# Patient Record
Sex: MALE | Race: BLACK OR AFRICAN AMERICAN | NOT HISPANIC OR LATINO | URBAN - METROPOLITAN AREA
[De-identification: names, ages, dates, MRNs, and addresses within clinical notes are randomized per-mention and may not be internally consistent; named-entity substitution may affect disease eponyms.]

---

## 2018-10-23 PROBLEM — 29857009 CHEST PAIN: Status: ACTIVE | Noted: 2018-10-23

## 2018-10-23 PROBLEM — 102614006 GENERALIZED ABDOMINAL PAIN: Status: ACTIVE | Noted: 2018-10-23

## 2018-10-23 PROBLEM — 4556007 GASTRITIS: Status: ACTIVE | Noted: 2018-10-23

## 2018-10-23 PROBLEM — 3696007 FUNCTIONAL DYSPEPSIA: Status: ACTIVE | Noted: 2018-10-23

## 2019-11-12 PROBLEM — 428283002 HISTORY OF POLYP OF COLON (SITUATION): Status: ACTIVE | Noted: 2019-11-12

## 2020-09-24 ENCOUNTER — OFFICE VISIT (OUTPATIENT)
Dept: URBAN - METROPOLITAN AREA CLINIC 29 | Facility: CLINIC | Age: 65
End: 2020-09-24

## 2020-09-24 PROBLEM — 414916001 OBESITY: Status: ACTIVE | Noted: 2020-09-24

## 2022-02-09 ENCOUNTER — OFFICE VISIT (OUTPATIENT)
Dept: URBAN - METROPOLITAN AREA CLINIC 27 | Facility: CLINIC | Age: 67
End: 2022-02-09

## 2022-03-18 ENCOUNTER — OFFICE VISIT (OUTPATIENT)
Dept: URBAN - METROPOLITAN AREA CLINIC 27 | Facility: CLINIC | Age: 67
End: 2022-03-18

## 2022-04-30 ENCOUNTER — TELEPHONE ENCOUNTER (OUTPATIENT)
Dept: URBAN - METROPOLITAN AREA CLINIC 121 | Facility: CLINIC | Age: 67
End: 2022-04-30

## 2022-04-30 RX ORDER — FAMOTIDINE 20 MG
QD TABLET ORAL
OUTPATIENT
Start: 2014-08-08 | End: 2015-03-02

## 2022-04-30 RX ORDER — SUCRALFATE 1 G/1
1 TABLET PO 1/2HR QAC AND QHS TABLET ORAL
OUTPATIENT
Start: 2014-08-08

## 2022-04-30 RX ORDER — SUCRALFATE 1 G/1
1 TABLET PO 1/2HR QAC AND QHS (QID TOTAL) TABLET ORAL
OUTPATIENT
Start: 2015-12-07 | End: 2018-10-23

## 2022-04-30 RX ORDER — TADALAFIL 5 MG/1
TABLET, FILM COATED ORAL
OUTPATIENT
Start: 2017-07-18

## 2022-04-30 RX ORDER — ESOMEPRAZOLE MAGNESIUM 40 MG
1 CAPSULE PO QD CAPSULE,DELAYED RELEASE (ENTERIC COATED) ORAL
OUTPATIENT
Start: 2015-12-07 | End: 2018-10-23

## 2022-04-30 RX ORDER — CHLORDIAZEPOXIDE HYDROCHLORIDE AND CLIDINIUM BROMIDE 5; 2.5 MG/1; MG/1
TAKE 1 CAPSULE 30 MINUTES BEFORE MEALS AND AT BEDTIME FOR ESOPHAGEAL SPASM CAPSULE, GELATIN COATED ORAL
OUTPATIENT
Start: 2019-07-09

## 2022-04-30 RX ORDER — CLONAZEPAM 0.5 MG/1
PRN TABLET ORAL
OUTPATIENT
Start: 2015-03-02 | End: 2015-12-07

## 2022-04-30 RX ORDER — SUCRALFATE 1 G/1
1 TABLET PO 1/2HR QAC AND QHS (QID TOTAL) TABLET ORAL
OUTPATIENT
Start: 2015-12-07

## 2022-04-30 RX ORDER — ATENOLOL 50 MG/1
QD TABLET ORAL
OUTPATIENT
Start: 2015-03-02

## 2022-04-30 RX ORDER — ATENOLOL 25 MG/1
QD TABLET ORAL
OUTPATIENT
Start: 2014-08-11

## 2022-04-30 RX ORDER — SUCRALFATE 1 G/10ML
1GM PO QAC AND QHS SUSPENSION ORAL
OUTPATIENT
Start: 2017-08-17 | End: 2018-10-23

## 2022-04-30 RX ORDER — HYOSCYAMINE SULFATE 0.12 MG/1
DISSOLVE 1/2 TO 1 TABLET UNDER THE TONGUE QAC AND QHS PRN TABLET, ORALLY DISINTEGRATING ORAL
OUTPATIENT
Start: 2016-12-12 | End: 2018-10-23

## 2022-04-30 RX ORDER — CHLORDIAZEPOXIDE HYDROCHLORIDE AND CLIDINIUM BROMIDE 5; 2.5 MG/1; MG/1
1 CAPSULE PO 30 MINUTES BEFORE MEALS AND QHS FOR ESOPHAGEAL SPASM CAPSULE ORAL
OUTPATIENT
Start: 2017-01-30

## 2022-04-30 RX ORDER — ESOMEPRAZOLE MAGNESIUM 40 MG
1 CAPSULE PO QD CAPSULE,DELAYED RELEASE (ENTERIC COATED) ORAL
OUTPATIENT
Start: 2017-01-30

## 2022-04-30 RX ORDER — CLONAZEPAM 0.5 MG/1
PRN TABLET ORAL
OUTPATIENT
Start: 2015-03-02

## 2022-04-30 RX ORDER — ESOMEPRAZOLE MAGNESIUM 40 MG
1 CAPSULE PO QD CAPSULE,DELAYED RELEASE (ENTERIC COATED) ORAL
OUTPATIENT
Start: 2018-10-23

## 2022-04-30 RX ORDER — SUCRALFATE 1 G/10ML
1GM PO QAC AND QHS SUSPENSION ORAL
OUTPATIENT
Start: 2017-08-17

## 2022-04-30 RX ORDER — HYOSCYAMINE SULFATE 0.12 MG/1
DISSOLVE 1/2 TO 1 TABLET UNDER THE TONGUE QAC AND QHS PRN TABLET, ORALLY DISINTEGRATING ORAL
OUTPATIENT
Start: 2016-12-12

## 2022-04-30 RX ORDER — ATENOLOL 25 MG/1
TABLET ORAL
OUTPATIENT
Start: 2010-08-25

## 2022-04-30 RX ORDER — CHLORDIAZEPOXIDE HYDROCHLORIDE AND CLIDINIUM BROMIDE 5; 2.5 MG/1; MG/1
TAKE 1 CAPSULE 30 MINUTES BEFORE MEALS AND AT BEDTIME FOR ESOPHAGEAL SPASM CAPSULE, GELATIN COATED ORAL
OUTPATIENT
Start: 2019-07-09 | End: 2021-10-04

## 2022-04-30 RX ORDER — ASPIRIN 81 MG/1
QD TABLET ORAL
OUTPATIENT
Start: 2015-03-02

## 2022-04-30 RX ORDER — HYOSCYAMINE SULFATE 0.12 MG/1
DISSOLVE 1/2 TO 1 TABLET UNDER TONGUE QAC AND QHS PRN TABLET, ORALLY DISINTEGRATING ORAL
OUTPATIENT
Start: 2016-11-17 | End: 2016-12-12

## 2022-04-30 RX ORDER — CHLORDIAZEPOXIDE HYDROCHLORIDE AND CLIDINIUM BROMIDE 5; 2.5 MG/1; MG/1
TAKE 1 CAPSULE 30 MINUTES BEFORE MEALS AND AT BEDTIME FOR ESOPHAGEAL SPASM CAPSULE ORAL
OUTPATIENT
Start: 2018-11-20

## 2022-04-30 RX ORDER — CHLORDIAZEPOXIDE HYDROCHLORIDE AND CLIDINIUM BROMIDE 5; 2.5 MG/1; MG/1
1 CAPSULE PO 30 MINUTES BEFORE MEALS AND QHS FOR ESOPHAGEAL SPASM CAPSULE ORAL
OUTPATIENT
Start: 2017-01-30 | End: 2018-10-23

## 2022-04-30 RX ORDER — ASPIRIN 81 MG/1
QD TABLET ORAL
OUTPATIENT
Start: 2015-03-02 | End: 2017-08-03

## 2022-04-30 RX ORDER — FINASTERIDE 5 MG/1
QD TABLET, FILM COATED ORAL
OUTPATIENT
Start: 2014-08-08 | End: 2015-03-02

## 2022-04-30 RX ORDER — HYDROCHLOROTHIAZIDE 12.5 MG/1
CAPSULE ORAL
OUTPATIENT
Start: 2011-08-29

## 2022-04-30 RX ORDER — CHLORDIAZEPOXIDE HYDROCHLORIDE AND CLIDINIUM BROMIDE 5; 2.5 MG/1; MG/1
TAKE 1 CAPSULE 30 MINUTES BEFORE MEALS AND AT BEDTIME FOR ESOPHAGEAL SPASM CAPSULE ORAL
OUTPATIENT
Start: 2018-11-20 | End: 2021-10-04

## 2022-04-30 RX ORDER — HYDROCHLOROTHIAZIDE 12.5 MG/1
QD CAPSULE ORAL
OUTPATIENT
Start: 2014-08-11

## 2022-04-30 RX ORDER — TADALAFIL 5 MG/1
TABLET, FILM COATED ORAL
OUTPATIENT
Start: 2017-07-18 | End: 2018-10-23

## 2022-04-30 RX ORDER — FAMOTIDINE 20 MG
QD TABLET ORAL
OUTPATIENT
Start: 2014-08-08

## 2022-04-30 RX ORDER — ESOMEPRAZOLE MAGNESIUM 40 MG
1 CAPSULE PO QD CAPSULE,DELAYED RELEASE (ENTERIC COATED) ORAL
OUTPATIENT
Start: 2015-12-07

## 2022-04-30 RX ORDER — ESOMEPRAZOLE MAGNESIUM 40 MG
1 CAPSULE PO QD CAPSULE,DELAYED RELEASE (ENTERIC COATED) ORAL
OUTPATIENT
Start: 2018-10-23 | End: 2022-02-09

## 2022-04-30 RX ORDER — HYOSCYAMINE SULFATE 0.12 MG/1
DISSOLVE 1/2 TO 1 TABLET UNDER TONGUE QAC AND QHS PRN TABLET, ORALLY DISINTEGRATING ORAL
OUTPATIENT
Start: 2016-11-17

## 2022-04-30 RX ORDER — ESOMEPRAZOLE MAGNESIUM 40 MG
1 CAPSULE PO QAM CAPSULE,DELAYED RELEASE (ENTERIC COATED) ORAL
OUTPATIENT
Start: 2017-01-30 | End: 2018-10-23

## 2022-04-30 RX ORDER — OMEPRAZOLE 20 MG/1
QD CAPSULE, DELAYED RELEASE ORAL
OUTPATIENT
Start: 2014-08-08

## 2022-04-30 RX ORDER — FINASTERIDE 5 MG/1
QD TABLET, FILM COATED ORAL
OUTPATIENT
Start: 2014-08-08

## 2022-04-30 RX ORDER — HYOSCYAMINE SULFATE 0.12 MG/1
DISSOLVE 1/2 TO 1 TABLET UNDER TONGUE QAC AND QHS PRN TABLET, ORALLY DISINTEGRATING ORAL
OUTPATIENT
Start: 2016-11-17 | End: 2018-10-23

## 2022-04-30 RX ORDER — OMEPRAZOLE 20 MG/1
1 CAPSULE PO QAM CAPSULE, DELAYED RELEASE ORAL
OUTPATIENT
Start: 2014-08-08 | End: 2015-12-07

## 2022-05-01 ENCOUNTER — TELEPHONE ENCOUNTER (OUTPATIENT)
Dept: URBAN - METROPOLITAN AREA CLINIC 121 | Facility: CLINIC | Age: 67
End: 2022-05-01

## 2022-05-01 RX ORDER — PIROXICAM 20 MG/1
CAPSULE ORAL
Status: ACTIVE | COMMUNITY
Start: 2020-09-24

## 2022-05-01 RX ORDER — CHLORDIAZEPOXIDE HYDROCHLORIDE 5 MG/1
CAPSULE ORAL
Status: ACTIVE | COMMUNITY
Start: 2018-10-23

## 2022-05-01 RX ORDER — ESOMEPRAZOLE MAGNESIUM 40 MG
TAKE 1 CAPSULE BY MOUTH TWICE A DAY CAPSULE,DELAYED RELEASE (ENTERIC COATED) ORAL
Status: ACTIVE | COMMUNITY
Start: 2022-02-09

## 2022-05-01 RX ORDER — OMEPRAZOLE 20 MG/1
CAPSULE, DELAYED RELEASE ORAL
Status: ACTIVE | COMMUNITY
Start: 2018-10-23

## 2022-05-01 RX ORDER — FAMOTIDINE 10 MG
1 CAPSULE PO EACH EVENING BEFORE DINNER TABLET ORAL
Status: ACTIVE | COMMUNITY
Start: 2017-11-17

## 2022-05-01 RX ORDER — ATENOLOL 50 MG/1
QD TABLET ORAL
Status: ACTIVE | COMMUNITY
Start: 2015-03-02

## 2022-05-01 RX ORDER — MULTIVIT-MIN/FOLIC/VIT K/LYCOP 400-300MCG
TABLET ORAL
Status: ACTIVE | COMMUNITY
Start: 2018-10-23

## 2022-05-01 RX ORDER — CHLORDIAZEPOXIDE HYDROCHLORIDE AND CLIDINIUM BROMIDE 5; 2.5 MG/1; MG/1
TAKE 1 CAPSULE 30 MINUTES BEFORE MEALS AND AT BEDTIME FOR ESOPHAGEAL SPASM CAPSULE, GELATIN COATED ORAL
Status: ACTIVE | COMMUNITY
Start: 2021-10-04

## 2022-05-01 RX ORDER — ROSUVASTATIN CALCIUM 5 MG/1
TABLET, FILM COATED ORAL
Status: ACTIVE | COMMUNITY
Start: 2018-10-23

## 2022-05-01 RX ORDER — SUCRALFATE 1 G/1
DISSOLVE 1 TABLET IN APPLESAUCE OR WATER, TAKE PO QAC TABLET ORAL
Status: ACTIVE | COMMUNITY
Start: 2019-03-05

## 2022-05-01 RX ORDER — CHLORDIAZEPOXIDE HYDROCHLORIDE AND CLIDINIUM BROMIDE 5; 2.5 MG/1; MG/1
TAKE 1 CAPSULE BY MOUTH THREE TIMES A DAY CAPSULE ORAL
Status: ACTIVE | COMMUNITY
Start: 2022-02-09

## 2022-05-01 RX ORDER — ATORVASTATIN CALCIUM 20 MG/1
TABLET, FILM COATED ORAL
Status: ACTIVE | COMMUNITY

## 2022-05-01 RX ORDER — ROSUVASTATIN CALCIUM 5 MG/1
TABLET, FILM COATED ORAL
Status: ACTIVE | COMMUNITY
Start: 2020-09-24

## 2022-05-01 RX ORDER — HYOSCYAMINE SULFATE 0.12 MG/1
DISSOLVE 1/2 TO 1 TABLET UNDER THE TONGUE QAC AND QHS PRN TABLET, ORALLY DISINTEGRATING ORAL
Status: ACTIVE | COMMUNITY
Start: 2016-12-12

## 2022-05-01 RX ORDER — ASCORBIC ACID 500 MG
TABLET,CHEWABLE ORAL
Status: ACTIVE | COMMUNITY

## 2022-05-01 RX ORDER — SODIUM SULFATE, POTASSIUM SULFATE, MAGNESIUM SULFATE 17.5; 3.13; 1.6 G/ML; G/ML; G/ML
MIX AND USE AS DIRECTED SOLUTION, CONCENTRATE ORAL
Status: ACTIVE | COMMUNITY
Start: 2019-11-12

## 2022-05-01 RX ORDER — HYDROCHLOROTHIAZIDE 25 MG/1
QD TABLET ORAL
Status: ACTIVE | COMMUNITY
Start: 2015-03-02

## 2022-06-28 ENCOUNTER — TELEPHONE ENCOUNTER (OUTPATIENT)
Dept: URBAN - METROPOLITAN AREA CLINIC 27 | Facility: CLINIC | Age: 67
End: 2022-06-28

## 2022-06-28 RX ORDER — CHLORDIAZEPOXIDE HYDROCHLORIDE 10 MG/1
1 CAPSULE CAPSULE ORAL TWICE A DAY
Qty: 120 CAPSULE | Refills: 1 | OUTPATIENT
Start: 2022-06-29

## 2022-10-24 ENCOUNTER — ERX REFILL RESPONSE (OUTPATIENT)
Dept: URBAN - METROPOLITAN AREA CLINIC 27 | Facility: CLINIC | Age: 67
End: 2022-10-24

## 2022-10-24 RX ORDER — CHLORDIAZEPOXIDE HYDROCHLORIDE AND CLIDINIUM BROMIDE 5; 2.5 MG/1; MG/1
TAKE 1 TABLET BY MOUTH 30 MINUTES BEFORE MEALS AND AT BEDTIME FOR SPASMS CAPSULE ORAL
Qty: 120 CAPSULE | Refills: 4 | OUTPATIENT

## 2022-10-24 RX ORDER — CHLORDIAZEPOXIDE HYDROCHLORIDE AND CLIDINIUM BROMIDE 5; 2.5 MG/1; MG/1
TAKE 1 CAPSULE 30 MINUTES BEFORE MEALS AND AT BEDTIME FOR ESOPHAGEAL SPASM CAPSULE, GELATIN COATED ORAL
OUTPATIENT

## 2022-12-02 ENCOUNTER — TELEPHONE ENCOUNTER (OUTPATIENT)
Dept: URBAN - METROPOLITAN AREA CLINIC 27 | Facility: CLINIC | Age: 67
End: 2022-12-02

## 2022-12-02 RX ORDER — ESOMEPRAZOLE MAGNESIUM 40 MG
TAKE 1 CAPSULE BY MOUTH TWICE A DAY CAPSULE,DELAYED RELEASE (ENTERIC COATED) ORAL TWICE A DAY
Qty: 180 | Refills: 3
Start: 2022-02-09

## 2023-01-06 ENCOUNTER — TELEPHONE ENCOUNTER (OUTPATIENT)
Dept: URBAN - METROPOLITAN AREA CLINIC 27 | Facility: CLINIC | Age: 68
End: 2023-01-06

## 2023-01-06 RX ORDER — CHLORDIAZEPOXIDE HYDROCHLORIDE AND CLIDINIUM BROMIDE 5; 2.5 MG/1; MG/1
TAKE 1 TABLET BY MOUTH 30 MINUTES BEFORE MEALS AND AT BEDTIME FOR SPASMS CAPSULE ORAL
Qty: 120 CAPSULE | Refills: 4

## 2023-02-27 ENCOUNTER — OFFICE VISIT (OUTPATIENT)
Dept: URBAN - METROPOLITAN AREA CLINIC 27 | Facility: CLINIC | Age: 68
End: 2023-02-27
Payer: MEDICARE

## 2023-02-27 ENCOUNTER — WEB ENCOUNTER (OUTPATIENT)
Dept: URBAN - METROPOLITAN AREA CLINIC 27 | Facility: CLINIC | Age: 68
End: 2023-02-27

## 2023-02-27 VITALS
DIASTOLIC BLOOD PRESSURE: 73 MMHG | HEART RATE: 74 BPM | BODY MASS INDEX: 34.07 KG/M2 | HEIGHT: 70 IN | RESPIRATION RATE: 17 BRPM | WEIGHT: 238 LBS | SYSTOLIC BLOOD PRESSURE: 127 MMHG

## 2023-02-27 DIAGNOSIS — R19.7 ACUTE DIARRHEA: ICD-10-CM

## 2023-02-27 DIAGNOSIS — K22.4 ESOPHAGEAL SPASM: ICD-10-CM

## 2023-02-27 PROCEDURE — 99214 OFFICE O/P EST MOD 30 MIN: CPT | Performed by: INTERNAL MEDICINE

## 2023-02-27 RX ORDER — CHLORDIAZEPOXIDE HYDROCHLORIDE 10 MG/1
1 CAPSULE CAPSULE ORAL TWICE A DAY
Qty: 120 CAPSULE | Refills: 1 | Status: ACTIVE | COMMUNITY
Start: 2022-06-29

## 2023-02-27 RX ORDER — PIROXICAM 20 MG/1
CAPSULE ORAL
Status: ACTIVE | COMMUNITY
Start: 2020-09-24

## 2023-02-27 RX ORDER — OMEPRAZOLE 20 MG/1
CAPSULE, DELAYED RELEASE ORAL
Status: ACTIVE | COMMUNITY
Start: 2018-10-23

## 2023-02-27 RX ORDER — MULTIVIT-MIN/FOLIC/VIT K/LYCOP 400-300MCG
TABLET ORAL
Status: ACTIVE | COMMUNITY
Start: 2018-10-23

## 2023-02-27 RX ORDER — CHLORDIAZEPOXIDE HYDROCHLORIDE AND CLIDINIUM BROMIDE 5; 2.5 MG/1; MG/1
1 CAPSULE BEFORE MEALS CAPSULE ORAL
Qty: 360 CAPSULE | Refills: 3 | OUTPATIENT
Start: 2023-02-27 | End: 2023-06-27

## 2023-02-27 RX ORDER — CHLORDIAZEPOXIDE HYDROCHLORIDE 5 MG/1
CAPSULE ORAL
Status: ACTIVE | COMMUNITY
Start: 2018-10-23

## 2023-02-27 RX ORDER — HYOSCYAMINE SULFATE 0.12 MG/1
DISSOLVE 1/2 TO 1 TABLET UNDER THE TONGUE QAC AND QHS PRN TABLET, ORALLY DISINTEGRATING ORAL
Status: ACTIVE | COMMUNITY
Start: 2016-12-12

## 2023-02-27 RX ORDER — SODIUM SULFATE, POTASSIUM SULFATE, MAGNESIUM SULFATE 17.5; 3.13; 1.6 G/ML; G/ML; G/ML
MIX AND USE AS DIRECTED SOLUTION, CONCENTRATE ORAL
Status: ACTIVE | COMMUNITY
Start: 2019-11-12

## 2023-02-27 RX ORDER — ROSUVASTATIN CALCIUM 5 MG/1
TABLET, FILM COATED ORAL
Status: ACTIVE | COMMUNITY
Start: 2020-09-24

## 2023-02-27 RX ORDER — ASCORBIC ACID 500 MG
TABLET,CHEWABLE ORAL
Status: ACTIVE | COMMUNITY

## 2023-02-27 RX ORDER — FAMOTIDINE 10 MG
1 CAPSULE PO EACH EVENING BEFORE DINNER TABLET ORAL
Status: ACTIVE | COMMUNITY
Start: 2017-11-17

## 2023-02-27 RX ORDER — CIPROFLOXACIN HYDROCHLORIDE 500 MG/1
1 TABLET TABLET, FILM COATED ORAL
Qty: 20 | OUTPATIENT
Start: 2023-02-27 | End: 2023-03-09

## 2023-02-27 RX ORDER — HYDROCHLOROTHIAZIDE 25 MG/1
QD TABLET ORAL
Status: ACTIVE | COMMUNITY
Start: 2015-03-02

## 2023-02-27 RX ORDER — CHLORDIAZEPOXIDE HYDROCHLORIDE AND CLIDINIUM BROMIDE 5; 2.5 MG/1; MG/1
TAKE 1 CAPSULE BY MOUTH THREE TIMES A DAY CAPSULE ORAL
Status: ACTIVE | COMMUNITY
Start: 2022-02-09

## 2023-02-27 RX ORDER — ATENOLOL 50 MG/1
QD TABLET ORAL
Status: ACTIVE | COMMUNITY
Start: 2015-03-02

## 2023-02-27 RX ORDER — CHLORDIAZEPOXIDE HYDROCHLORIDE AND CLIDINIUM BROMIDE 5; 2.5 MG/1; MG/1
TAKE 1 TABLET BY MOUTH 30 MINUTES BEFORE MEALS AND AT BEDTIME FOR SPASMS CAPSULE ORAL
Qty: 120 CAPSULE | Refills: 4 | Status: ACTIVE | COMMUNITY

## 2023-02-27 RX ORDER — ATORVASTATIN CALCIUM 20 MG/1
TABLET, FILM COATED ORAL
Status: ACTIVE | COMMUNITY

## 2023-02-27 RX ORDER — SUCRALFATE 1 G/1
DISSOLVE 1 TABLET IN APPLESAUCE OR WATER, TAKE PO QAC TABLET ORAL
Status: ACTIVE | COMMUNITY
Start: 2019-03-05

## 2023-02-27 RX ORDER — METRONIDAZOLE 500 MG/1
1 TABLET TABLET ORAL THREE TIMES A DAY
Qty: 30 | OUTPATIENT
Start: 2023-02-27 | End: 2023-03-09

## 2023-02-27 RX ORDER — ESOMEPRAZOLE MAGNESIUM 40 MG
TAKE 1 CAPSULE BY MOUTH TWICE A DAY CAPSULE,DELAYED RELEASE (ENTERIC COATED) ORAL TWICE A DAY
Qty: 180 | Refills: 3 | Status: ACTIVE | COMMUNITY
Start: 2022-02-09

## 2023-02-27 NOTE — HPI-TODAY'S VISIT:
Mr. Hughes presents today with complaints of diarrhea which started over the past week.  He began having loose watery stools after eating a [employer] breakfast sandwich which consisted of egg, cheese and sausage.  He also had a low-grade fever according to his wife.  He denies any recent antibiotics or any other new medications.  He is having up to 7-10 stools a daily over the past week.  Otherwise, he has no other complaints.  Incidentally, he reports that he typically takes Nexium as well as Librax for his esophageal spasm/GERD.  He actually had a different vendor for his Esomeprazole and began to have episodes of vertigo.  However, when he went back to his prescription from [employer] utilizing their vendor for Esomeprazole and has not had any further vertginous symptoms.  Otherwise, he has no other GI complaints.  He needs a refill of his Librax.

## 2023-03-06 ENCOUNTER — TELEPHONE ENCOUNTER (OUTPATIENT)
Dept: URBAN - METROPOLITAN AREA CLINIC 29 | Facility: CLINIC | Age: 68
End: 2023-03-06

## 2023-03-06 RX ORDER — CHLORDIAZEPOXIDE HYDROCHLORIDE AND CLIDINIUM BROMIDE 5; 2.5 MG/1; MG/1
TAKE 1 TABLET BY MOUTH 30 MINUTES BEFORE MEALS AND AT BEDTIME FOR SPASMS CAPSULE ORAL
Qty: 120 CAPSULE | Refills: 4
End: 2023-08-03

## 2023-03-28 ENCOUNTER — CLAIMS CREATED FROM THE CLAIM WINDOW (OUTPATIENT)
Dept: URBAN - METROPOLITAN AREA CLINIC 4 | Facility: CLINIC | Age: 68
End: 2023-03-28
Payer: MEDICARE

## 2023-03-28 ENCOUNTER — OFFICE VISIT (OUTPATIENT)
Dept: URBAN - METROPOLITAN AREA CLINIC 26 | Facility: CLINIC | Age: 68
End: 2023-03-28
Payer: MEDICARE

## 2023-03-28 ENCOUNTER — OFFICE VISIT (OUTPATIENT)
Dept: URBAN - METROPOLITAN AREA SURGERY CENTER 7 | Facility: SURGERY CENTER | Age: 68
End: 2023-03-28

## 2023-03-28 ENCOUNTER — OFFICE VISIT (OUTPATIENT)
Dept: URBAN - METROPOLITAN AREA SURGERY CENTER 7 | Facility: SURGERY CENTER | Age: 68
End: 2023-03-28
Payer: MEDICARE

## 2023-03-28 ENCOUNTER — TELEPHONE ENCOUNTER (OUTPATIENT)
Dept: URBAN - METROPOLITAN AREA CLINIC 35 | Facility: CLINIC | Age: 68
End: 2023-03-28

## 2023-03-28 DIAGNOSIS — R12 BURNING REFLUX: ICD-10-CM

## 2023-03-28 DIAGNOSIS — R13.19 CERVICAL DYSPHAGIA: ICD-10-CM

## 2023-03-28 DIAGNOSIS — K31.89 ACQUIRED DEFORMITY OF DUODENUM: ICD-10-CM

## 2023-03-28 DIAGNOSIS — R19.7 ACUTE DIARRHEA: ICD-10-CM

## 2023-03-28 DIAGNOSIS — K29.70 GASTRITIS, UNSPECIFIED, WITHOUT BLEEDING: ICD-10-CM

## 2023-03-28 DIAGNOSIS — K21.9 ACID REFLUX: ICD-10-CM

## 2023-03-28 PROCEDURE — G8907 PT DOC NO EVENTS ON DISCHARG: HCPCS | Performed by: CLINIC/CENTER

## 2023-03-28 PROCEDURE — 43450 DILATE ESOPHAGUS 1/MULT PASS: CPT | Performed by: INTERNAL MEDICINE

## 2023-03-28 PROCEDURE — 43239 EGD BIOPSY SINGLE/MULTIPLE: CPT | Performed by: INTERNAL MEDICINE

## 2023-03-28 PROCEDURE — 43239 EGD BIOPSY SINGLE/MULTIPLE: CPT | Performed by: CLINIC/CENTER

## 2023-03-28 PROCEDURE — 83013 H PYLORI (C-13) BREATH: CPT | Performed by: INTERNAL MEDICINE

## 2023-03-28 PROCEDURE — 83014 H PYLORI DRUG ADMIN: CPT | Performed by: INTERNAL MEDICINE

## 2023-03-28 PROCEDURE — 43450 DILATE ESOPHAGUS 1/MULT PASS: CPT | Performed by: CLINIC/CENTER

## 2023-03-28 PROCEDURE — G8907 PT DOC NO EVENTS ON DISCHARG: HCPCS | Performed by: INTERNAL MEDICINE

## 2023-03-28 PROCEDURE — 88312 SPECIAL STAINS GROUP 1: CPT | Performed by: PATHOLOGY

## 2023-03-28 PROCEDURE — 88305 TISSUE EXAM BY PATHOLOGIST: CPT | Performed by: PATHOLOGY

## 2023-03-28 RX ORDER — CHLORDIAZEPOXIDE HYDROCHLORIDE 5 MG/1
CAPSULE ORAL
Status: ACTIVE | COMMUNITY
Start: 2018-10-23

## 2023-03-28 RX ORDER — HYDROCHLOROTHIAZIDE 25 MG/1
QD TABLET ORAL
Status: ACTIVE | COMMUNITY
Start: 2015-03-02

## 2023-03-28 RX ORDER — ROSUVASTATIN CALCIUM 5 MG/1
TABLET, FILM COATED ORAL
Status: ACTIVE | COMMUNITY
Start: 2020-09-24

## 2023-03-28 RX ORDER — SODIUM SULFATE, POTASSIUM SULFATE, MAGNESIUM SULFATE 17.5; 3.13; 1.6 G/ML; G/ML; G/ML
MIX AND USE AS DIRECTED SOLUTION, CONCENTRATE ORAL
Status: ACTIVE | COMMUNITY
Start: 2019-11-12

## 2023-03-28 RX ORDER — CHLORDIAZEPOXIDE HYDROCHLORIDE 10 MG/1
1 CAPSULE CAPSULE ORAL TWICE A DAY
Qty: 120 CAPSULE | Refills: 1 | Status: ACTIVE | COMMUNITY
Start: 2022-06-29

## 2023-03-28 RX ORDER — ASCORBIC ACID 500 MG
TABLET,CHEWABLE ORAL
Status: ACTIVE | COMMUNITY

## 2023-03-28 RX ORDER — MULTIVIT-MIN/FOLIC/VIT K/LYCOP 400-300MCG
TABLET ORAL
Status: ACTIVE | COMMUNITY
Start: 2018-10-23

## 2023-03-28 RX ORDER — CHLORDIAZEPOXIDE HYDROCHLORIDE AND CLIDINIUM BROMIDE 5; 2.5 MG/1; MG/1
1 CAPSULE BEFORE MEALS CAPSULE ORAL THREE TIMES A DAY
Qty: 90 | OUTPATIENT
Start: 2023-03-28 | End: 2023-04-27

## 2023-03-28 RX ORDER — FAMOTIDINE 10 MG
1 CAPSULE PO EACH EVENING BEFORE DINNER TABLET ORAL
Status: ACTIVE | COMMUNITY
Start: 2017-11-17

## 2023-03-28 RX ORDER — ATORVASTATIN CALCIUM 20 MG/1
TABLET, FILM COATED ORAL
Status: ACTIVE | COMMUNITY

## 2023-03-28 RX ORDER — HYOSCYAMINE SULFATE 0.12 MG/1
DISSOLVE 1/2 TO 1 TABLET UNDER THE TONGUE QAC AND QHS PRN TABLET, ORALLY DISINTEGRATING ORAL
Status: ACTIVE | COMMUNITY
Start: 2016-12-12

## 2023-03-28 RX ORDER — CHLORDIAZEPOXIDE HYDROCHLORIDE AND CLIDINIUM BROMIDE 5; 2.5 MG/1; MG/1
TAKE 1 CAPSULE BY MOUTH THREE TIMES A DAY CAPSULE ORAL
Status: ACTIVE | COMMUNITY
Start: 2022-02-09

## 2023-03-28 RX ORDER — PIROXICAM 20 MG/1
CAPSULE ORAL
Status: ACTIVE | COMMUNITY
Start: 2020-09-24

## 2023-03-28 RX ORDER — OMEPRAZOLE 20 MG/1
CAPSULE, DELAYED RELEASE ORAL
Status: ACTIVE | COMMUNITY
Start: 2018-10-23

## 2023-03-28 RX ORDER — ATENOLOL 50 MG/1
QD TABLET ORAL
Status: ACTIVE | COMMUNITY
Start: 2015-03-02

## 2023-03-28 RX ORDER — SUCRALFATE 1 G/1
DISSOLVE 1 TABLET IN APPLESAUCE OR WATER, TAKE PO QAC TABLET ORAL
Status: ACTIVE | COMMUNITY
Start: 2019-03-05

## 2023-03-28 RX ORDER — ESOMEPRAZOLE MAGNESIUM 40 MG
TAKE 1 CAPSULE BY MOUTH TWICE A DAY CAPSULE,DELAYED RELEASE (ENTERIC COATED) ORAL TWICE A DAY
Qty: 180 | Refills: 3 | Status: ACTIVE | COMMUNITY
Start: 2022-02-09

## 2023-03-28 RX ORDER — CHLORDIAZEPOXIDE HYDROCHLORIDE AND CLIDINIUM BROMIDE 5; 2.5 MG/1; MG/1
TAKE 1 TABLET BY MOUTH 30 MINUTES BEFORE MEALS AND AT BEDTIME FOR SPASMS CAPSULE ORAL
Qty: 120 CAPSULE | Refills: 4 | Status: ACTIVE | COMMUNITY
End: 2023-08-03

## 2023-03-28 RX ORDER — CHLORDIAZEPOXIDE HYDROCHLORIDE AND CLIDINIUM BROMIDE 5; 2.5 MG/1; MG/1
1 CAPSULE BEFORE MEALS CAPSULE ORAL
Qty: 360 CAPSULE | Refills: 3 | Status: ACTIVE | COMMUNITY
Start: 2023-02-27 | End: 2023-06-27

## 2023-04-26 ENCOUNTER — TELEPHONE ENCOUNTER (OUTPATIENT)
Dept: URBAN - METROPOLITAN AREA CLINIC 27 | Facility: CLINIC | Age: 68
End: 2023-04-26

## 2023-04-26 RX ORDER — CHLORDIAZEPOXIDE HYDROCHLORIDE AND CLIDINIUM BROMIDE 5; 2.5 MG/1; MG/1
1 CAPSULE BEFORE MEALS CAPSULE ORAL THREE TIMES A DAY
Qty: 90
Start: 2023-03-28 | End: 2023-05-26

## 2023-07-24 ENCOUNTER — TELEPHONE ENCOUNTER (OUTPATIENT)
Dept: URBAN - METROPOLITAN AREA CLINIC 27 | Facility: CLINIC | Age: 68
End: 2023-07-24

## 2023-07-24 RX ORDER — CHLORDIAZEPOXIDE HYDROCHLORIDE AND CLIDINIUM BROMIDE 5; 2.5 MG/1; MG/1
TAKE 1 CAPSULE BY MOUTH CAPSULE ORAL
Qty: 90 CAPLET | Refills: 3
Start: 2022-02-09 | End: 2023-10-22

## 2023-09-18 ENCOUNTER — TELEPHONE ENCOUNTER (OUTPATIENT)
Dept: URBAN - METROPOLITAN AREA CLINIC 27 | Facility: CLINIC | Age: 68
End: 2023-09-18

## 2023-09-18 RX ORDER — CHLORDIAZEPOXIDE HYDROCHLORIDE 5 MG/1
CAPSULE ORAL
Start: 2018-10-23

## 2023-10-02 ENCOUNTER — OFFICE VISIT (OUTPATIENT)
Dept: URBAN - METROPOLITAN AREA CLINIC 27 | Facility: CLINIC | Age: 68
End: 2023-10-02

## 2023-10-12 ENCOUNTER — OFFICE VISIT (OUTPATIENT)
Dept: URBAN - METROPOLITAN AREA CLINIC 29 | Facility: CLINIC | Age: 68
End: 2023-10-12
Payer: MEDICARE

## 2023-10-12 VITALS
SYSTOLIC BLOOD PRESSURE: 156 MMHG | DIASTOLIC BLOOD PRESSURE: 88 MMHG | BODY MASS INDEX: 36.36 KG/M2 | HEIGHT: 70 IN | WEIGHT: 254 LBS | HEART RATE: 70 BPM

## 2023-10-12 DIAGNOSIS — K22.4 ESOPHAGEAL SPASM: ICD-10-CM

## 2023-10-12 PROBLEM — 266434009: Status: ACTIVE | Noted: 2023-02-27

## 2023-10-12 PROCEDURE — 99213 OFFICE O/P EST LOW 20 MIN: CPT | Performed by: INTERNAL MEDICINE

## 2023-10-12 RX ORDER — SUCRALFATE 1 G/1
DISSOLVE 1 TABLET IN APPLESAUCE OR WATER, TAKE PO QAC TABLET ORAL
Status: DISCONTINUED | COMMUNITY
Start: 2019-03-05

## 2023-10-12 RX ORDER — CHLORDIAZEPOXIDE HYDROCHLORIDE AND CLIDINIUM BROMIDE 5; 2.5 MG/1; MG/1
TAKE 1 CAPSULE BY MOUTH CAPSULE ORAL
Qty: 90 CAPLET | Refills: 3 | Status: DISCONTINUED | COMMUNITY
Start: 2022-02-09 | End: 2023-10-22

## 2023-10-12 RX ORDER — HYOSCYAMINE SULFATE 0.12 MG/1
DISSOLVE 1/2 TO 1 TABLET UNDER THE TONGUE QAC AND QHS PRN TABLET, ORALLY DISINTEGRATING ORAL
Status: ACTIVE | COMMUNITY
Start: 2016-12-12

## 2023-10-12 RX ORDER — FAMOTIDINE 10 MG
1 CAPSULE PO EACH EVENING BEFORE DINNER TABLET ORAL
Status: DISCONTINUED | COMMUNITY
Start: 2017-11-17

## 2023-10-12 RX ORDER — PIROXICAM 20 MG/1
CAPSULE ORAL
Status: DISCONTINUED | COMMUNITY
Start: 2020-09-24

## 2023-10-12 RX ORDER — ATENOLOL 50 MG/1
QD TABLET ORAL
Status: ACTIVE | COMMUNITY
Start: 2015-03-02

## 2023-10-12 RX ORDER — HYDROCHLOROTHIAZIDE 25 MG/1
QD TABLET ORAL
Status: ACTIVE | COMMUNITY
Start: 2015-03-02

## 2023-10-12 RX ORDER — OMEPRAZOLE 20 MG/1
CAPSULE, DELAYED RELEASE ORAL
Status: DISCONTINUED | COMMUNITY
Start: 2018-10-23

## 2023-10-12 RX ORDER — CHLORDIAZEPOXIDE HYDROCHLORIDE 5 MG/1
CAPSULE ORAL
Status: ACTIVE | COMMUNITY
Start: 2018-10-23

## 2023-10-12 RX ORDER — CHLORDIAZEPOXIDE HYDROCHLORIDE 10 MG/1
1 CAPSULE CAPSULE ORAL TWICE A DAY
Qty: 120 CAPSULE | Refills: 1 | Status: DISCONTINUED | COMMUNITY
Start: 2022-06-29

## 2023-10-12 RX ORDER — ESOMEPRAZOLE MAGNESIUM 40 MG
TAKE 1 CAPSULE BY MOUTH TWICE A DAY CAPSULE,DELAYED RELEASE (ENTERIC COATED) ORAL TWICE A DAY
Qty: 180 | Refills: 3 | Status: DISCONTINUED | COMMUNITY
Start: 2022-02-09

## 2023-10-12 RX ORDER — ASCORBIC ACID 500 MG
TABLET,CHEWABLE ORAL
Status: ACTIVE | COMMUNITY

## 2023-10-12 RX ORDER — ROSUVASTATIN CALCIUM 5 MG/1
TABLET, FILM COATED ORAL
Status: ACTIVE | COMMUNITY
Start: 2020-09-24

## 2023-10-12 RX ORDER — SODIUM SULFATE, POTASSIUM SULFATE, MAGNESIUM SULFATE 17.5; 3.13; 1.6 G/ML; G/ML; G/ML
MIX AND USE AS DIRECTED SOLUTION, CONCENTRATE ORAL
Status: DISCONTINUED | COMMUNITY
Start: 2019-11-12

## 2023-10-12 RX ORDER — MULTIVIT-MIN/FOLIC/VIT K/LYCOP 400-300MCG
TABLET ORAL
Status: ACTIVE | COMMUNITY
Start: 2018-10-23

## 2023-10-12 RX ORDER — ATORVASTATIN CALCIUM 20 MG/1
TABLET, FILM COATED ORAL
Status: DISCONTINUED | COMMUNITY

## 2023-10-12 NOTE — HPI-TODAY'S VISIT:
Mr. Garcia presents today for routine follow-up of his esophageal spasms.  He is currently doing well taking his omeprazole twice daily.s he reports infrequent regurgitation depending on what he eats. His endoscopy in March 2022 was notable for GERD and reactive gastropathy.  Otherwise, he has no other complaints

## 2023-12-08 ENCOUNTER — ERX REFILL RESPONSE (OUTPATIENT)
Dept: URBAN - METROPOLITAN AREA CLINIC 27 | Facility: CLINIC | Age: 68
End: 2023-12-08

## 2023-12-08 RX ORDER — ESOMEPRAZOLE MAGNESIUM 40 MG/1
TAKE 1 CAPSULE TWICE A DAY CAPSULE, DELAYED RELEASE PELLETS ORAL
Qty: 180 CAPSULE | Refills: 3 | OUTPATIENT

## 2023-12-08 RX ORDER — CHLORDIAZEPOXIDE HCL AND CLIDINIUM BROMIDE 5; 2.5 MG/1; MG/1
TAKE 1 CAPSULE THREE TIMES A DAY BEFORE MEALS CAPSULE ORAL
Qty: 90 CAPSULE | Refills: 11 | OUTPATIENT

## 2023-12-08 RX ORDER — CHLORDIAZEPOXIDE HCL AND CLIDINIUM BROMIDE 5; 2.5 MG/1; MG/1
TAKE 1 CAPSULE THREE TIMES A DAY BEFORE MEALS CAPSULE ORAL
Qty: 90 CAPSULE | Refills: 12 | OUTPATIENT

## 2023-12-08 RX ORDER — ESOMEPRAZOLE MAGNESIUM 40 MG/1
TAKE 1 CAPSULE TWICE A DAY CAPSULE, DELAYED RELEASE PELLETS ORAL
Qty: 180 CAPSULE | Refills: 4 | OUTPATIENT

## 2023-12-18 ENCOUNTER — OFFICE VISIT (OUTPATIENT)
Dept: URBAN - METROPOLITAN AREA CLINIC 27 | Facility: CLINIC | Age: 68
End: 2023-12-18
Payer: MEDICARE

## 2023-12-18 VITALS
RESPIRATION RATE: 17 BRPM | HEIGHT: 70 IN | DIASTOLIC BLOOD PRESSURE: 86 MMHG | BODY MASS INDEX: 35.65 KG/M2 | HEART RATE: 66 BPM | SYSTOLIC BLOOD PRESSURE: 152 MMHG | WEIGHT: 249 LBS

## 2023-12-18 DIAGNOSIS — R10.33 PERIUMBILICAL PAIN: ICD-10-CM

## 2023-12-18 PROCEDURE — 99214 OFFICE O/P EST MOD 30 MIN: CPT | Performed by: INTERNAL MEDICINE

## 2023-12-18 RX ORDER — CHLORDIAZEPOXIDE HCL AND CLIDINIUM BROMIDE 5; 2.5 MG/1; MG/1
TAKE 1 CAPSULE THREE TIMES A DAY BEFORE MEALS CAPSULE ORAL
Qty: 90 CAPSULE | Refills: 11 | Status: ACTIVE | COMMUNITY

## 2023-12-18 RX ORDER — ATENOLOL 50 MG/1
QD TABLET ORAL
Status: ACTIVE | COMMUNITY
Start: 2015-03-02

## 2023-12-18 RX ORDER — CHLORDIAZEPOXIDE HYDROCHLORIDE 5 MG/1
CAPSULE ORAL
Status: ACTIVE | COMMUNITY
Start: 2018-10-23

## 2023-12-18 RX ORDER — ROSUVASTATIN CALCIUM 5 MG/1
TABLET, FILM COATED ORAL
Status: ACTIVE | COMMUNITY
Start: 2020-09-24

## 2023-12-18 RX ORDER — MULTIVIT-MIN/FOLIC/VIT K/LYCOP 400-300MCG
TABLET ORAL
Status: ACTIVE | COMMUNITY
Start: 2018-10-23

## 2023-12-18 RX ORDER — HYDROCHLOROTHIAZIDE 25 MG/1
QD TABLET ORAL
Status: ACTIVE | COMMUNITY
Start: 2015-03-02

## 2023-12-18 RX ORDER — ESOMEPRAZOLE MAGNESIUM 40 MG/1
TAKE 1 CAPSULE TWICE A DAY CAPSULE, DELAYED RELEASE PELLETS ORAL
Qty: 180 CAPSULE | Refills: 3 | Status: ACTIVE | COMMUNITY

## 2023-12-18 RX ORDER — ASCORBIC ACID 500 MG
TABLET,CHEWABLE ORAL
Status: ACTIVE | COMMUNITY

## 2023-12-18 RX ORDER — HYOSCYAMINE SULFATE 0.12 MG/1
DISSOLVE 1/2 TO 1 TABLET UNDER THE TONGUE QAC AND QHS PRN TABLET, ORALLY DISINTEGRATING ORAL
Status: ACTIVE | COMMUNITY
Start: 2016-12-12

## 2023-12-18 NOTE — PHYSICAL EXAM GASTROINTESTINAL
Soft, obese, mild tenderness to palpation in the periumbilical area, no guarding or rigidity, normal active bowel sounds.

## 2023-12-18 NOTE — HPI-TODAY'S VISIT:
Mr. Hughes presents today with complaints of periumbilical pain that he has been experiencing for the past few weeks.  The pain typically lasts for few seconds and dissipates.  He denies any history of abdominal trauma or surgery.  Otherwise, he has no other GI complaints.

## 2023-12-19 ENCOUNTER — LAB OUTSIDE AN ENCOUNTER (OUTPATIENT)
Dept: URBAN - METROPOLITAN AREA CLINIC 27 | Facility: CLINIC | Age: 68
End: 2023-12-19

## 2023-12-28 ENCOUNTER — LAB OUTSIDE AN ENCOUNTER (OUTPATIENT)
Dept: URBAN - METROPOLITAN AREA CLINIC 27 | Facility: CLINIC | Age: 68
End: 2023-12-28

## 2024-01-03 ENCOUNTER — TELEPHONE ENCOUNTER (OUTPATIENT)
Dept: URBAN - METROPOLITAN AREA CLINIC 27 | Facility: CLINIC | Age: 69
End: 2024-01-03

## 2024-01-11 ENCOUNTER — TELEPHONE ENCOUNTER (OUTPATIENT)
Dept: URBAN - METROPOLITAN AREA CLINIC 27 | Facility: CLINIC | Age: 69
End: 2024-01-11

## 2024-01-16 ENCOUNTER — P2P PATIENT RECORD (OUTPATIENT)
Age: 69
End: 2024-01-16

## 2024-01-22 ENCOUNTER — DASHBOARD ENCOUNTERS (OUTPATIENT)
Age: 69
End: 2024-01-22

## 2024-01-22 ENCOUNTER — OFFICE VISIT (OUTPATIENT)
Dept: URBAN - METROPOLITAN AREA CLINIC 27 | Facility: CLINIC | Age: 69
End: 2024-01-22
Payer: MEDICARE

## 2024-01-22 VITALS
SYSTOLIC BLOOD PRESSURE: 147 MMHG | DIASTOLIC BLOOD PRESSURE: 87 MMHG | RESPIRATION RATE: 17 BRPM | WEIGHT: 245 LBS | HEART RATE: 67 BPM | HEIGHT: 70 IN | BODY MASS INDEX: 35.07 KG/M2

## 2024-01-22 DIAGNOSIS — R10.33 PERIUMBILICAL PAIN: ICD-10-CM

## 2024-01-22 PROBLEM — 443503005: Status: ACTIVE | Noted: 2023-12-19

## 2024-01-22 PROCEDURE — 99214 OFFICE O/P EST MOD 30 MIN: CPT | Performed by: INTERNAL MEDICINE

## 2024-01-22 RX ORDER — CHLORDIAZEPOXIDE HCL AND CLIDINIUM BROMIDE 5; 2.5 MG/1; MG/1
TAKE 1 CAPSULE THREE TIMES A DAY BEFORE MEALS CAPSULE ORAL
Qty: 90 CAPSULE | Refills: 11 | Status: ACTIVE | COMMUNITY

## 2024-01-22 RX ORDER — ASCORBIC ACID 500 MG
TABLET,CHEWABLE ORAL
Status: ACTIVE | COMMUNITY

## 2024-01-22 RX ORDER — MULTIVIT-MIN/FOLIC/VIT K/LYCOP 400-300MCG
TABLET ORAL
Status: ACTIVE | COMMUNITY
Start: 2018-10-23

## 2024-01-22 RX ORDER — HYDROCHLOROTHIAZIDE 25 MG/1
QD TABLET ORAL
Status: ACTIVE | COMMUNITY
Start: 2015-03-02

## 2024-01-22 RX ORDER — ATENOLOL 50 MG/1
QD TABLET ORAL
Status: ACTIVE | COMMUNITY
Start: 2015-03-02

## 2024-01-22 RX ORDER — ROSUVASTATIN CALCIUM 5 MG/1
TABLET, FILM COATED ORAL
Status: ACTIVE | COMMUNITY
Start: 2020-09-24

## 2024-01-22 RX ORDER — HYOSCYAMINE SULFATE 0.12 MG/1
DISSOLVE 1/2 TO 1 TABLET UNDER THE TONGUE QAC AND QHS PRN TABLET, ORALLY DISINTEGRATING ORAL
Status: ACTIVE | COMMUNITY
Start: 2016-12-12

## 2024-01-22 RX ORDER — CHLORDIAZEPOXIDE HYDROCHLORIDE 5 MG/1
CAPSULE ORAL
Status: ACTIVE | COMMUNITY
Start: 2018-10-23

## 2024-01-22 RX ORDER — ESOMEPRAZOLE MAGNESIUM 40 MG/1
TAKE 1 CAPSULE TWICE A DAY CAPSULE, DELAYED RELEASE PELLETS ORAL
Qty: 180 CAPSULE | Refills: 3 | Status: ACTIVE | COMMUNITY

## 2024-01-22 NOTE — HPI-TODAY'S VISIT:
Mr. Hughes presents today for follow-up of his abdominal pain. He had a CT scan of the abdomen which was unremarkable. He still has periodic periumbilical discomfort. Otherwise, he has no other complaints.

## 2024-07-10 ENCOUNTER — TELEPHONE ENCOUNTER (OUTPATIENT)
Dept: URBAN - METROPOLITAN AREA CLINIC 27 | Facility: CLINIC | Age: 69
End: 2024-07-10

## 2024-07-10 RX ORDER — ESOMEPRAZOLE MAGNESIUM 40 MG/1
1 CAPSULE CAPSULE, DELAYED RELEASE PELLETS ORAL ONCE A DAY
Qty: 90 CAPSULE | Refills: 3

## 2024-07-26 ENCOUNTER — OFFICE VISIT (OUTPATIENT)
Dept: URBAN - METROPOLITAN AREA CLINIC 27 | Facility: CLINIC | Age: 69
End: 2024-07-26
Payer: MEDICARE

## 2024-07-26 VITALS
WEIGHT: 252 LBS | BODY MASS INDEX: 36.08 KG/M2 | SYSTOLIC BLOOD PRESSURE: 148 MMHG | HEART RATE: 53 BPM | HEIGHT: 70 IN | DIASTOLIC BLOOD PRESSURE: 85 MMHG

## 2024-07-26 DIAGNOSIS — K30 FUNCTIONAL DYSPEPSIA: ICD-10-CM

## 2024-07-26 DIAGNOSIS — R10.84 GENERALIZED ABDOMINAL PAIN: ICD-10-CM

## 2024-07-26 PROCEDURE — 99213 OFFICE O/P EST LOW 20 MIN: CPT | Performed by: INTERNAL MEDICINE

## 2024-07-26 RX ORDER — ROSUVASTATIN CALCIUM 5 MG/1
TABLET, FILM COATED ORAL
Status: ACTIVE | COMMUNITY
Start: 2020-09-24

## 2024-07-26 RX ORDER — MULTIVIT-MIN/FOLIC/VIT K/LYCOP 400-300MCG
TABLET ORAL
Status: ACTIVE | COMMUNITY
Start: 2018-10-23

## 2024-07-26 RX ORDER — CHLORDIAZEPOXIDE HCL AND CLIDINIUM BROMIDE 5; 2.5 MG/1; MG/1
TAKE 1 CAPSULE THREE TIMES A DAY BEFORE MEALS CAPSULE ORAL
Qty: 90 CAPSULE | Refills: 11 | Status: ACTIVE | COMMUNITY

## 2024-07-26 RX ORDER — ESOMEPRAZOLE MAGNESIUM 40 MG/1
TAKE 1 CAPSULE TWICE A DAY CAPSULE, DELAYED RELEASE PELLETS ORAL
Qty: 180 CAPSULE | Refills: 3 | Status: DISCONTINUED | COMMUNITY

## 2024-07-26 RX ORDER — ATENOLOL 50 MG/1
QD TABLET ORAL
Status: ACTIVE | COMMUNITY
Start: 2015-03-02

## 2024-07-26 RX ORDER — CHLORDIAZEPOXIDE HYDROCHLORIDE 5 MG/1
CAPSULE ORAL
Status: ACTIVE | COMMUNITY
Start: 2018-10-23

## 2024-07-26 RX ORDER — ESOMEPRAZOLE MAGNESIUM 40 MG/1
1 CAPSULE CAPSULE, DELAYED RELEASE PELLETS ORAL ONCE A DAY
Qty: 90 CAPSULE | Refills: 3 | Status: ACTIVE | COMMUNITY

## 2024-07-26 RX ORDER — ASCORBIC ACID 500 MG
TABLET,CHEWABLE ORAL
Status: ACTIVE | COMMUNITY

## 2024-07-26 RX ORDER — HYOSCYAMINE SULFATE 0.12 MG/1
DISSOLVE 1/2 TO 1 TABLET UNDER THE TONGUE QAC AND QHS PRN TABLET, ORALLY DISINTEGRATING ORAL
Status: DISCONTINUED | COMMUNITY
Start: 2016-12-12

## 2024-07-26 RX ORDER — HYDROCHLOROTHIAZIDE 25 MG/1
QD TABLET ORAL
Status: ACTIVE | COMMUNITY
Start: 2015-03-02

## 2024-07-26 NOTE — HPI-TODAY'S VISIT:
Mr. Gurrola presents today for routine follow-up of his abdominal pain and GERD.  He reports his abdominal discomfort has since resolved.  As far as his heartburn, he is taking Esomeprazole which controls his symptoms.  Otherwise, he has no other GI complaints.  He admits that he has a history of esophageal spasms and takes Librax as needed.

## 2024-07-30 ENCOUNTER — TELEPHONE ENCOUNTER (OUTPATIENT)
Dept: URBAN - METROPOLITAN AREA CLINIC 27 | Facility: CLINIC | Age: 69
End: 2024-07-30

## 2024-07-30 RX ORDER — ESOMEPRAZOLE MAGNESIUM 40 MG/1
1 CAPSULE CAPSULE, DELAYED RELEASE PELLETS ORAL ONCE A DAY
Qty: 90 CAPSULE | Refills: 3

## 2025-01-06 ENCOUNTER — CLAIMS CREATED FROM THE CLAIM WINDOW (OUTPATIENT)
Dept: URBAN - METROPOLITAN AREA SURGERY CENTER 7 | Facility: SURGERY CENTER | Age: 70
End: 2025-01-06
Payer: MEDICARE

## 2025-01-06 ENCOUNTER — CLAIMS CREATED FROM THE CLAIM WINDOW (OUTPATIENT)
Dept: URBAN - METROPOLITAN AREA CLINIC 4 | Facility: CLINIC | Age: 70
End: 2025-01-06
Payer: MEDICARE

## 2025-01-06 DIAGNOSIS — Z86.0101 PERSONAL HISTORY OF ADENOMATOUS AND SERRATED COLON POLYPS: ICD-10-CM

## 2025-01-06 DIAGNOSIS — D12.4 ADENOMA OF DESCENDING COLON: ICD-10-CM

## 2025-01-06 DIAGNOSIS — K63.89 OTHER SPECIFIED DISEASES OF INTESTINE: ICD-10-CM

## 2025-01-06 DIAGNOSIS — D12.4 BENIGN NEOPLASM OF DESCENDING COLON: ICD-10-CM

## 2025-01-06 DIAGNOSIS — K63.5 POLYP OF COLON: ICD-10-CM

## 2025-01-06 DIAGNOSIS — Z09 ENCNTR FOR F/U EXAM AFT TRTMT FOR COND OTH THAN MALIG NEOPLM: ICD-10-CM

## 2025-01-06 DIAGNOSIS — Z86.0100 PERSONAL HISTORY OF COLONIC POLYPS: ICD-10-CM

## 2025-01-06 DIAGNOSIS — D12.4 ADENOMATOUS POLYP OF DESCENDING COLON: ICD-10-CM

## 2025-01-06 DIAGNOSIS — K64.0 GRADE I INTERNAL HEMORRHOIDS: ICD-10-CM

## 2025-01-06 DIAGNOSIS — D12.3 ADENOMATOUS POLYP OF TRANSVERSE COLON: ICD-10-CM

## 2025-01-06 PROCEDURE — 00811 ANES LWR INTST NDSC NOS: CPT | Performed by: NURSE ANESTHETIST, CERTIFIED REGISTERED

## 2025-01-06 PROCEDURE — 0529F INTRVL 3/>YR PTS CLNSCP DOCD: CPT | Performed by: CLINIC/CENTER

## 2025-01-06 PROCEDURE — 88305 TISSUE EXAM BY PATHOLOGIST: CPT | Performed by: PATHOLOGY

## 2025-01-06 PROCEDURE — 45380 COLONOSCOPY AND BIOPSY: CPT | Performed by: CLINIC/CENTER

## 2025-01-06 PROCEDURE — 0529F INTRVL 3/>YR PTS CLNSCP DOCD: CPT | Performed by: INTERNAL MEDICINE

## 2025-01-06 PROCEDURE — 45380 COLONOSCOPY AND BIOPSY: CPT | Performed by: INTERNAL MEDICINE

## 2025-01-06 RX ORDER — MULTIVIT-MIN/FOLIC/VIT K/LYCOP 400-300MCG
TABLET ORAL
Status: ACTIVE | COMMUNITY
Start: 2018-10-23

## 2025-01-06 RX ORDER — CHLORDIAZEPOXIDE HYDROCHLORIDE 5 MG/1
CAPSULE ORAL
Status: ACTIVE | COMMUNITY
Start: 2018-10-23

## 2025-01-06 RX ORDER — ASCORBIC ACID 500 MG
TABLET,CHEWABLE ORAL
Status: ACTIVE | COMMUNITY

## 2025-01-06 RX ORDER — CHLORDIAZEPOXIDE HCL AND CLIDINIUM BROMIDE 5; 2.5 MG/1; MG/1
TAKE 1 CAPSULE THREE TIMES A DAY BEFORE MEALS CAPSULE ORAL
Qty: 90 CAPSULE | Refills: 11 | Status: ACTIVE | COMMUNITY

## 2025-01-06 RX ORDER — ATENOLOL 50 MG/1
QD TABLET ORAL
Status: ACTIVE | COMMUNITY
Start: 2015-03-02

## 2025-01-06 RX ORDER — ESOMEPRAZOLE MAGNESIUM 40 MG/1
1 CAPSULE CAPSULE, DELAYED RELEASE PELLETS ORAL ONCE A DAY
Qty: 90 CAPSULE | Refills: 3 | Status: ACTIVE | COMMUNITY

## 2025-01-06 RX ORDER — ROSUVASTATIN CALCIUM 5 MG/1
TABLET, FILM COATED ORAL
Status: ACTIVE | COMMUNITY
Start: 2020-09-24

## 2025-01-06 RX ORDER — HYDROCHLOROTHIAZIDE 25 MG/1
QD TABLET ORAL
Status: ACTIVE | COMMUNITY
Start: 2015-03-02

## 2025-01-09 ENCOUNTER — ERX REFILL RESPONSE (OUTPATIENT)
Dept: URBAN - METROPOLITAN AREA CLINIC 27 | Facility: CLINIC | Age: 70
End: 2025-01-09

## 2025-01-09 RX ORDER — CHLORDIAZEPOXIDE HCL AND CLIDINIUM BROMIDE 5; 2.5 MG/1; MG/1
TAKE 1 CAPSULE THREE TIMES A DAY BEFORE MEALS CAPSULE ORAL
Qty: 90 CAPSULE | Refills: 11 | OUTPATIENT

## 2025-01-09 RX ORDER — CHLORDIAZEPOXIDE HYDROCHLORIDE AND CLIDINIUM BROMIDE 5; 2.5 MG/1; MG/1
TAKE 1 CAPSULE THREE TIMES A DAY BEFORE MEALS CAPSULE ORAL
Qty: 90 CAPSULE | Refills: 11 | OUTPATIENT

## 2025-05-01 ENCOUNTER — TELEPHONE ENCOUNTER (OUTPATIENT)
Dept: URBAN - METROPOLITAN AREA CLINIC 27 | Facility: CLINIC | Age: 70
End: 2025-05-01

## 2025-05-01 RX ORDER — ESOMEPRAZOLE MAGNESIUM 40 MG/1
1 CAPSULE 1/2 TO 1 HOUR BEFORE MORNING MEAL CAPSULE, DELAYED RELEASE PELLETS ORAL ONCE A DAY
Qty: 90 CAPSULE | Refills: 5 | OUTPATIENT
Start: 2025-05-01

## 2025-07-08 ENCOUNTER — OFFICE VISIT (OUTPATIENT)
Dept: URBAN - METROPOLITAN AREA CLINIC 27 | Facility: CLINIC | Age: 70
End: 2025-07-08
Payer: MEDICARE

## 2025-07-08 DIAGNOSIS — K30 FUNCTIONAL DYSPEPSIA: ICD-10-CM

## 2025-07-08 PROCEDURE — 99213 OFFICE O/P EST LOW 20 MIN: CPT | Performed by: INTERNAL MEDICINE

## 2025-07-08 RX ORDER — CHLORDIAZEPOXIDE HYDROCHLORIDE 5 MG/1
CAPSULE ORAL
Status: ACTIVE | COMMUNITY
Start: 2018-10-23

## 2025-07-08 RX ORDER — HYDROCHLOROTHIAZIDE 25 MG/1
QD TABLET ORAL
Status: ACTIVE | COMMUNITY
Start: 2015-03-02

## 2025-07-08 RX ORDER — ESOMEPRAZOLE MAGNESIUM 40 MG/1
1 CAPSULE CAPSULE, DELAYED RELEASE PELLETS ORAL ONCE A DAY
Qty: 90 CAPSULE | Refills: 3 | Status: ACTIVE | COMMUNITY

## 2025-07-08 RX ORDER — MULTIVIT-MIN/FOLIC/VIT K/LYCOP 400-300MCG
TABLET ORAL
Status: ACTIVE | COMMUNITY
Start: 2018-10-23

## 2025-07-08 RX ORDER — ESOMEPRAZOLE MAGNESIUM 40 MG/1
1 CAPSULE 1/2 TO 1 HOUR BEFORE MORNING MEAL CAPSULE, DELAYED RELEASE PELLETS ORAL ONCE A DAY
Qty: 90 CAPSULE | Refills: 5 | Status: ACTIVE | COMMUNITY
Start: 2025-05-01

## 2025-07-08 RX ORDER — CHLORDIAZEPOXIDE HYDROCHLORIDE AND CLIDINIUM BROMIDE 5; 2.5 MG/1; MG/1
TAKE 1 CAPSULE THREE TIMES A DAY BEFORE MEALS CAPSULE ORAL
Qty: 90 CAPSULE | Refills: 11 | Status: DISCONTINUED | COMMUNITY

## 2025-07-08 RX ORDER — ROSUVASTATIN CALCIUM 5 MG/1
TABLET, FILM COATED ORAL
Status: ACTIVE | COMMUNITY
Start: 2020-09-24

## 2025-07-08 RX ORDER — ATENOLOL 50 MG/1
QD TABLET ORAL
Status: ACTIVE | COMMUNITY
Start: 2015-03-02

## 2025-07-08 RX ORDER — ASCORBIC ACID 500 MG
TABLET,CHEWABLE ORAL
Status: DISCONTINUED | COMMUNITY

## 2025-07-08 NOTE — HPI-TODAY'S VISIT:
Mr. Hughes presents today for routine follow-up of his heartburn.  He is clinically doing well taking esomeprazole 40 mg twice daily.  He had a past history of some lower abdominal discomfort which has since also resolved.  His colonoscopy in January was notable for polyps and internal hemorrhoids.  Otherwise, he has no other GI complaints.